# Patient Record
Sex: FEMALE | Race: OTHER | Employment: OTHER | URBAN - METROPOLITAN AREA
[De-identification: names, ages, dates, MRNs, and addresses within clinical notes are randomized per-mention and may not be internally consistent; named-entity substitution may affect disease eponyms.]

---

## 2019-11-11 ENCOUNTER — HOSPITAL ENCOUNTER (OUTPATIENT)
Dept: MAMMOGRAPHY | Age: 49
Discharge: HOME | End: 2019-11-16
Payer: COMMERCIAL

## 2019-11-11 DIAGNOSIS — Z12.31 VISIT FOR SCREENING MAMMOGRAM: ICD-10-CM

## 2019-11-11 PROCEDURE — 77067 SCR MAMMO BI INCL CAD: CPT

## 2020-02-05 ENCOUNTER — HOSPITAL ENCOUNTER (OUTPATIENT)
Dept: NEUROLOGY | Age: 50
Discharge: HOME | End: 2020-02-05
Payer: COMMERCIAL

## 2020-02-05 PROCEDURE — 95908 NRV CNDJ TST 3-4 STUDIES: CPT | Performed by: PHYSICAL MEDICINE & REHABILITATION

## 2020-02-05 PROCEDURE — 95885 MUSC TST DONE W/NERV TST LIM: CPT | Performed by: PHYSICAL MEDICINE & REHABILITATION

## 2020-02-05 NOTE — PROCEDURES
[address]      Electrodiagnostic Report  Test Date:  2020    Patient: Tayler Hennessy : 1969 Physician: Sirisha Rangel D.O.   Sex: Female Height:   Ref Phys: Uvaldo MarcosBARRY day     Patient Complaints:  Patient is a 52year-old female who presents with pain in the left lower extremity worse since August    Patient History / Exam:  PMH: + fatty liver disease fibromyalgia + knee surgery x4 last No back or hip surgery PE: reflexes absent, normal strength     Impression: Study is consistent with mild compromise of left peroneal (fibular) nerve at knee. No evidence of an acute radiculopathy or other entrapment neuropathy. Thank you. NCV & EMG Findings:  Evaluation of the left peroneal motor nerve showed decreased conduction velocity (Poplt-B Fib, 35 m/s). The left Sup Peroneal sensory nerve showed reduced amplitude (2.1 µV). All remaining nerves (as indicated in the following tables) were within normal limits. All examined muscles (as indicated in the following table) showed no evidence of electrical instability.                 Sirisha Rangel D.O.        Nerve Conduction Studies  Anti Sensory Summary Table     Stim Site NR Peak (ms) Norm Peak (ms) P-T Amp (µV) Norm P-T Amp Site1 Site2 Delta-P (ms) Dist (cm) Tommy (m/s) Norm Tommy (m/s)   Left Sup Peroneal Anti Sensory (Ant Lat Mall)   14 cm    2.6 <4.4 2.1 >5.0 14 cm Ant Lat Mall 2.6 14.0 54        1.4  8.8          Left Sural Anti Sensory (Lat Mall)   Calf    2.7 <4.2 17.2 >5.0 Calf Lat Mall 2.7 14.0 52      Motor Summary Table     Stim Site NR Onset (ms) Norm Onset (ms) O-P Amp (mV) Norm O-P Amp Site1 Site2 Delta-0 (ms) Dist (cm) Tommy (m/s) Norm Tommy (m/s)   Left Peroneal Motor (Ext Dig Brev)   Ankle    4.7 <5.5 3.7 >2.5 B Fib Ankle 4.6 32.0 70 >40   B Fib    9.3  2.7  Poplt B Fib 1.7 6.0 35 >40   Poplt    11.0  1.0          Left Tibial Motor (Abd Dillard Brev)   Ankle    4.1 <6.2 3.4 >3.0           EMG     Side Muscle

## 2020-07-08 NOTE — PROGRESS NOTES
[facility] / [facility] ([facility]) Imelda Dimas, [address] [facility]    Acknowledgment of Informed Consent for Surgical or Medical Procedure and Sedation  I agree to allow doctor(s) Lydia Ordonez and his/her associates or assistants, including residents and/or other qualified medical practitioner to perform the following medical treatment or procedure and to administer or direct the administration of sedation as necessary:  Procedure(s): LEFT KNEE ARTHROSCOPIC MEDIAL MENISECTOMY CHONDROPLASTY AND SYNOVECTOMY  My doctor has explained the following regarding the proposed procedure:   the explanation of the procedure   the benefits of the procedure   the potential problems that might occur during recuperation   the risks and side effects of the procedure which could include but are not limited to severe blood loss, infection, stroke or death   the benefits, risks and side effect of alternative procedures including the consequences of declining this procedure or any alternative procedures   the likelihood of achieving satisfactory results. I acknowledge no guarantee or assurance has been made to me regarding the results. I understand that during the course of this treatment/procedure, unforeseen conditions can occur which require an additional or different procedure. I agree to allow my physician or assistants to perform such extension of the original procedure as they may find necessary. I understand that sedation will often result in temporary impairment of memory and fine motor skills and that sedation can occasionally progress to a state of deep sedation or general anesthesia. I understand the risks of anesthesia for surgery include, but are not limited to, sore throat, hoarseness, injury to face, mouth, or teeth; nausea; headache; injury to blood vessels or nerves; death, brain damage, or paralysis.     I understand that if I have a Limitation of Treatment order in effect during my hospitalization, the order may or may not be in effect during this procedure. I give my doctor permission to give me blood or blood products. I understand that there are risks with receiving blood such as hepatitis, AIDS, fever, or allergic reaction. I acknowledge that the risks, benefits, and alternatives of this treatment have been explained to me and that no express or implied warranty has been given by the hospital, any blood bank, or any person or entity as to the blood or blood components transfused. At the discretion of my doctor, I agree to allow observers, equipment/product representatives and allow photographing, and/or televising of the procedure, provided my name or identity is maintained confidentially. I agree the hospital may dispose of or use for scientific or educational purposes any tissue, fluid, or body parts which may be removed.     ________________________________Date________Time______ am/pm  ([place])  Patient or Signature of Closest Relative or Legal Guardian    ________________________________Date________Time______am/pm      Page 1 of  1  Witness

## 2020-07-08 NOTE — PROGRESS NOTES
[facility] [employer] / [facility] ([facility]) [address] [facility], [address]    Acknowledgment of Informed Consent for Surgical or Medical Procedure and Sedation  I agree to allow doctor(s) Jayden Wong and his/her associates or assistants, including residents and/or other qualified medical practitioner to perform the following medical treatment or procedure and to administer or direct the administration of sedation as necessary:  Procedure(s): ON THE LEFT NEUROPLASTY OF COMMON PERONEAL NERVE, APPLICATION  [address]  My doctor has explained the following regarding the proposed procedure:   the explanation of the procedure   the benefits of the procedure   the potential problems that might occur during recuperation   the risks and side effects of the procedure which could include but are not limited to severe blood loss, infection, stroke or death   the benefits, risks and side effect of alternative procedures including the consequences of declining this procedure or any alternative procedures   the likelihood of achieving satisfactory results. I acknowledge no guarantee or assurance has been made to me regarding the results. I understand that during the course of this treatment/procedure, unforeseen conditions can occur which require an additional or different procedure. I agree to allow my physician or assistants to perform such extension of the original procedure as they may find necessary. I understand that sedation will often result in temporary impairment of memory and fine motor skills and that sedation can occasionally progress to a state of deep sedation or general anesthesia. I understand the risks of anesthesia for surgery include, but are not limited to, sore throat, hoarseness, injury to face, mouth, or teeth; nausea; headache; injury to blood vessels or nerves; death, brain damage, or paralysis.     I understand that if I have a Limitation of Treatment order in effect

## 2020-07-10 ENCOUNTER — OFFICE VISIT (OUTPATIENT)
Dept: PRIMARY CARE CLINIC | Age: 50
End: 2020-07-10
Payer: COMMERCIAL

## 2020-07-10 PROCEDURE — 99211 OFF/OP EST MAY X REQ PHY/QHP: CPT | Performed by: NURSE PRACTITIONER

## 2020-07-15 ENCOUNTER — ANESTHESIA EVENT (OUTPATIENT)
Dept: OPERATING ROOM | Age: 50
End: 2020-07-15
Payer: COMMERCIAL

## 2020-07-15 LAB
SARS-COV-2: NOT DETECTED
SOURCE: NORMAL

## 2020-07-15 RX ORDER — CHLORAL HYDRATE 500 MG
1 CAPSULE ORAL DAILY
COMMUNITY

## 2020-07-15 RX ORDER — CYANOCOBALAMIN (VITAMIN B-12) 500 MCG
400 LOZENGE ORAL DAILY
COMMUNITY

## 2020-07-15 RX ORDER — CIPROFLOXACIN 500 MG/1
500 TABLET, FILM COATED ORAL 2 TIMES DAILY
Status: ON HOLD | COMMUNITY
Start: 2020-07-07 | End: 2020-07-16 | Stop reason: ALTCHOICE

## 2020-07-15 RX ORDER — IBUPROFEN 200 MG
200 TABLET ORAL EVERY 6 HOURS PRN
COMMUNITY

## 2020-07-15 NOTE — PROGRESS NOTES

## 2020-07-16 ENCOUNTER — HOSPITAL ENCOUNTER (OUTPATIENT)
Age: 50
Setting detail: OUTPATIENT SURGERY
Discharge: HOME | End: 2020-07-16
Attending: PODIATRIST | Admitting: PODIATRIST
Payer: COMMERCIAL

## 2020-07-16 ENCOUNTER — ANESTHESIA (OUTPATIENT)
Dept: OPERATING ROOM | Age: 50
End: 2020-07-16
Payer: COMMERCIAL

## 2020-07-16 VITALS
HEIGHT: 59 IN | BODY MASS INDEX: 36.89 KG/M2 | WEIGHT: 183 LBS | TEMPERATURE: 97.8 F | DIASTOLIC BLOOD PRESSURE: 66 MMHG | SYSTOLIC BLOOD PRESSURE: 111 MMHG | HEART RATE: 95 BPM | RESPIRATION RATE: 16 BRPM | OXYGEN SATURATION: 97 %

## 2020-07-16 VITALS — SYSTOLIC BLOOD PRESSURE: 141 MMHG | DIASTOLIC BLOOD PRESSURE: 82 MMHG

## 2020-07-16 LAB — PREGNANCY, URINE: NEGATIVE

## 2020-07-16 PROCEDURE — 6360000002 HC RX W HCPCS: Performed by: ANESTHESIOLOGY

## 2020-07-16 PROCEDURE — 7100000000 HC PACU RECOVERY - FIRST 15 MIN: Performed by: PODIATRIST

## 2020-07-16 PROCEDURE — 7100000010 HC PHASE II RECOVERY - FIRST 15 MIN: Performed by: PODIATRIST

## 2020-07-16 PROCEDURE — 2580000003 HC RX 258: Performed by: PODIATRIST

## 2020-07-16 PROCEDURE — 3600000014 HC SURGERY LEVEL 4 ADDTL 15MIN: Performed by: PODIATRIST

## 2020-07-16 PROCEDURE — 84703 CHORIONIC GONADOTROPIN ASSAY: CPT

## 2020-07-16 PROCEDURE — 3700000001 HC ADD 15 MINUTES (ANESTHESIA): Performed by: PODIATRIST

## 2020-07-16 PROCEDURE — 3600000004 HC SURGERY LEVEL 4 BASE: Performed by: PODIATRIST

## 2020-07-16 PROCEDURE — 7100000011 HC PHASE II RECOVERY - ADDTL 15 MIN: Performed by: PODIATRIST

## 2020-07-16 PROCEDURE — 3700000000 HC ANESTHESIA ATTENDED CARE: Performed by: PODIATRIST

## 2020-07-16 PROCEDURE — 64445 NJX AA&/STRD SCIATIC NRV IMG: CPT | Performed by: ANESTHESIOLOGY

## 2020-07-16 PROCEDURE — 2500000003 HC RX 250 WO HCPCS: Performed by: PODIATRIST

## 2020-07-16 PROCEDURE — 7100000001 HC PACU RECOVERY - ADDTL 15 MIN: Performed by: PODIATRIST

## 2020-07-16 PROCEDURE — 76942 ECHO GUIDE FOR BIOPSY: CPT | Performed by: ANESTHESIOLOGY

## 2020-07-16 PROCEDURE — 2709999900 HC NON-CHARGEABLE SUPPLY: Performed by: PODIATRIST

## 2020-07-16 PROCEDURE — 2580000003 HC RX 258: Performed by: ANESTHESIOLOGY

## 2020-07-16 RX ORDER — SODIUM CHLORIDE 9 MG/ML
INJECTION, SOLUTION INTRAVENOUS CONTINUOUS
Status: DISCONTINUED | OUTPATIENT
Start: 2020-07-16 | End: 2020-07-16 | Stop reason: HOSPADM

## 2020-07-16 RX ORDER — DEXAMETHASONE SODIUM PHOSPHATE 4 MG/ML
INJECTION, SOLUTION INTRA-ARTICULAR; INTRALESIONAL; INTRAMUSCULAR; INTRAVENOUS; SOFT TISSUE
Status: DISCONTINUED
Start: 2020-07-16 | End: 2020-07-16 | Stop reason: HOSPADM

## 2020-07-16 RX ORDER — SODIUM CHLORIDE 0.9 % (FLUSH) 0.9 %
10 SYRINGE (ML) INJECTION EVERY 12 HOURS SCHEDULED
Status: DISCONTINUED | OUTPATIENT
Start: 2020-07-16 | End: 2020-07-16 | Stop reason: HOSPADM

## 2020-07-16 RX ORDER — SODIUM CHLORIDE, SODIUM LACTATE, POTASSIUM CHLORIDE, CALCIUM CHLORIDE 600; 310; 30; 20 MG/100ML; MG/100ML; MG/100ML; MG/100ML
INJECTION, SOLUTION INTRAVENOUS CONTINUOUS
Status: DISCONTINUED | OUTPATIENT
Start: 2020-07-16 | End: 2020-07-16 | Stop reason: HOSPADM

## 2020-07-16 RX ORDER — MIDAZOLAM HYDROCHLORIDE 1 MG/ML
INJECTION INTRAMUSCULAR; INTRAVENOUS
Status: DISCONTINUED
Start: 2020-07-16 | End: 2020-07-16 | Stop reason: HOSPADM

## 2020-07-16 RX ORDER — HYDROCODONE BITARTRATE AND ACETAMINOPHEN 5; 325 MG/1; MG/1
1 TABLET ORAL
Status: DISCONTINUED | OUTPATIENT
Start: 2020-07-16 | End: 2020-07-16 | Stop reason: HOSPADM

## 2020-07-16 RX ORDER — ROPIVACAINE HYDROCHLORIDE 5 MG/ML
INJECTION, SOLUTION EPIDURAL; INFILTRATION; PERINEURAL
Status: DISCONTINUED | OUTPATIENT
Start: 2020-07-16 | End: 2020-07-16 | Stop reason: SDUPTHER

## 2020-07-16 RX ORDER — BUPIVACAINE HYDROCHLORIDE AND EPINEPHRINE 2.5; 5 MG/ML; UG/ML
INJECTION, SOLUTION EPIDURAL; INFILTRATION; INTRACAUDAL; PERINEURAL PRN
Status: DISCONTINUED | OUTPATIENT
Start: 2020-07-16 | End: 2020-07-16 | Stop reason: ALTCHOICE

## 2020-07-16 RX ORDER — SODIUM CHLORIDE 0.9 % (FLUSH) 0.9 %
10 SYRINGE (ML) INJECTION PRN
Status: DISCONTINUED | OUTPATIENT
Start: 2020-07-16 | End: 2020-07-16 | Stop reason: HOSPADM

## 2020-07-16 RX ORDER — 0.9 % SODIUM CHLORIDE 0.9 %
500 INTRAVENOUS SOLUTION INTRAVENOUS
Status: DISCONTINUED | OUTPATIENT
Start: 2020-07-16 | End: 2020-07-16 | Stop reason: HOSPADM

## 2020-07-16 RX ORDER — DIPHENHYDRAMINE HYDROCHLORIDE 50 MG/ML
12.5 INJECTION INTRAMUSCULAR; INTRAVENOUS
Status: DISCONTINUED | OUTPATIENT
Start: 2020-07-16 | End: 2020-07-16 | Stop reason: HOSPADM

## 2020-07-16 RX ORDER — MAGNESIUM HYDROXIDE 1200 MG/15ML
LIQUID ORAL CONTINUOUS PRN
Status: COMPLETED | OUTPATIENT
Start: 2020-07-16 | End: 2020-07-16

## 2020-07-16 RX ORDER — LIDOCAINE HYDROCHLORIDE 20 MG/ML
JELLY TOPICAL
Status: DISCONTINUED
Start: 2020-07-16 | End: 2020-07-16 | Stop reason: HOSPADM

## 2020-07-16 RX ORDER — PROCHLORPERAZINE EDISYLATE 5 MG/ML
5 INJECTION INTRAMUSCULAR; INTRAVENOUS
Status: DISCONTINUED | OUTPATIENT
Start: 2020-07-16 | End: 2020-07-16 | Stop reason: HOSPADM

## 2020-07-16 RX ORDER — ROPIVACAINE HYDROCHLORIDE 5 MG/ML
INJECTION, SOLUTION EPIDURAL; INFILTRATION; PERINEURAL
Status: COMPLETED
Start: 2020-07-16 | End: 2020-07-16

## 2020-07-16 RX ORDER — CEFAZOLIN SODIUM 2 G/50ML
2 SOLUTION INTRAVENOUS ONCE
Status: DISCONTINUED | OUTPATIENT
Start: 2020-07-16 | End: 2020-07-16 | Stop reason: HOSPADM

## 2020-07-16 RX ORDER — BUPIVACAINE HYDROCHLORIDE 5 MG/ML
INJECTION, SOLUTION EPIDURAL; INTRACAUDAL
Status: DISCONTINUED | OUTPATIENT
Start: 2020-07-16 | End: 2020-07-16 | Stop reason: SDUPTHER

## 2020-07-16 RX ORDER — ONDANSETRON 2 MG/ML
4 INJECTION INTRAMUSCULAR; INTRAVENOUS
Status: DISCONTINUED | OUTPATIENT
Start: 2020-07-16 | End: 2020-07-16 | Stop reason: HOSPADM

## 2020-07-16 RX ORDER — HYDRALAZINE HYDROCHLORIDE 20 MG/ML
5 INJECTION INTRAMUSCULAR; INTRAVENOUS EVERY 10 MIN PRN
Status: DISCONTINUED | OUTPATIENT
Start: 2020-07-16 | End: 2020-07-16 | Stop reason: HOSPADM

## 2020-07-16 RX ORDER — MEPERIDINE HYDROCHLORIDE 25 MG/ML
12.5 INJECTION INTRAMUSCULAR; INTRAVENOUS; SUBCUTANEOUS EVERY 5 MIN PRN
Status: DISCONTINUED | OUTPATIENT
Start: 2020-07-16 | End: 2020-07-16 | Stop reason: HOSPADM

## 2020-07-16 RX ADMIN — ROPIVACAINE HYDROCHLORIDE 10 ML: 5 INJECTION, SOLUTION EPIDURAL; INFILTRATION; PERINEURAL at 09:05

## 2020-07-16 RX ADMIN — SODIUM CHLORIDE, POTASSIUM CHLORIDE, SODIUM LACTATE AND CALCIUM CHLORIDE: 600; 310; 30; 20 INJECTION, SOLUTION INTRAVENOUS at 06:38

## 2020-07-16 RX ADMIN — ROPIVACAINE HYDROCHLORIDE 20 ML: 5 INJECTION, SOLUTION EPIDURAL; INFILTRATION; PERINEURAL at 09:03

## 2020-07-16 ASSESSMENT — PAIN SCALES - GENERAL
PAINLEVEL_OUTOF10: 0
PAINLEVEL_OUTOF10: 0

## 2020-07-16 ASSESSMENT — PAIN - FUNCTIONAL ASSESSMENT: PAIN_FUNCTIONAL_ASSESSMENT: 0-10

## 2020-07-16 ASSESSMENT — LIFESTYLE VARIABLES: SMOKING_STATUS: 0

## 2020-07-16 ASSESSMENT — PAIN DESCRIPTION - DESCRIPTORS: DESCRIPTORS: ACHING

## 2020-07-16 NOTE — ANESTHESIA PROCEDURE NOTES
spread noted around the femoral artery. 20 cc total volume injected. Negative aspiration for heme prior to injection and at several points during injection. Procedure tolerated well. No apparent complications.   Medications Administered  Ropivacaine (NAROPIN) injection 0.5%, 10 mL  Reason for block: post-op pain management and at surgeon's request

## 2020-07-16 NOTE — PROGRESS NOTES
Anesthesia here to do block. left Popliteal and Saphenous blocks  O2 Mony@[employer]  Start time:0650  Stop time:0700  Tolerated well    See flow sheet for vital signs.

## 2020-07-16 NOTE — OP NOTE
Operative Note      Patient: Tayler Hennessy  YOB: 1970  MRN: [id number]    Date of Procedure: 7/16/2020    Pre-Op Diagnosis: NEURITIS M79.2, INJURY OF MAJOR PERIPHERAL NERVE S84.802D    Post-Op Diagnosis: Same       Procedure(s):    1. ON THE LEFT NEUROPLASTY OF COMMON PERONEAL NERVE    Surgeon(s):  Laurence Canas DPM    Assistant:   Resident: Alicja Gallardo DPM; Corinne Renteria DPM    Anesthesia: General with peripheral nerve block via anesthesia     Hemostasis: Anatomic dissection and marcaine with epinephrine     Estimated Blood Loss (mL): less than 50      Materials: 3-0 Vicryl, 4-0 Vicryl, Staples     Injectables: Pre: 3 cc 0.5% marcaine with epinephrine; Post: None     Complications: None    Specimens:   * No specimens in log *    Implants:  * No implants in log *      Drains: * No LDAs found *    Findings: Intra-operatively entrapment of common peroneal nerve at the posterior crural intermuscular septum    Indications for the procedure: The patient presents to the operating room today due to recalcitrant left lower extremity pain. Due to exhausting all conservative treatment options including but not limited to decrease in activity, NSAIDs, and physical therapy it was determined at this time that the patient would benefit from surgical intervention. All potential risks, benefits, and complications were discussed with the patient prior to the scheduling of the procedure. All patient's questions were answered and no guarantees were given. The patient wished to proceed with surgery and written informed consent was obtained. Detailed Description of Procedure: The patient was brought from the preoperative area into the operating room and placed on the operating room table in the supine position with care to pad all bony prominences. Following a period of sedation, a well-padded pneumatic thigh tourniquet was applied to the left lower extremity.   However, it should be noted that the pneumatic thigh tourniquet was never inflated during the procedure. Next, a preoperative anesthetic block consisting of 3 cc of 0.5% Marcaine with epinephrine was injected along the planned out incision site for hemostasis. Next, the left lower extremity was then scrubbed, prepped, and draped in the usual sterile fashion. A timeout was then performed. The patient, procedure, and operative site were identified and confirmed and the following procedure was performed. Procedure #1: Neuroplasty of common peroneal nerve, Left lower extremity: At this time, attention was directed to the left lower extremity where an approximately 8 cm curvilinear incision was centered over the fibular head. Next, utilizing a #15 blade the skin was incised down to the subcutaneous layer. All bleeders were identified electrocauterized as they were encountered. All vital neurovascular structures were carefully identified and retracted on the surgical field. Next, utilizing combination of sharp and blunt dissection the incision was deepened down to the deep fascial layer. At this time, the fibular head and posterior crural intermuscular septum overlying the Proteus longus tendon was identified, as well as, the fascial layer over the common peroneal nerve. Intraoperatively entrapment of the common peroneal nerve was noted at the posterior crural intermuscular septum. Next, utilizing a Metzenbaum scissors the fascial layer coursing along the common peroneal nerve was released. Next, utilizing a #15 blade the posterior crural intermuscular septum coursing along the common peroneal nerve was released. Next, utilizing blunt dissection the proximal tunnel of the common peroneal nerve and the distal tunnel were freed up of any other underlying fascial bands. Following release of the fascial layer overlying the common peroneal nerve and the posterior crural intermuscular septum no further underlying nerve entrapment was identified.   Next, the wound was then irrigated with copious amounts of normal sterile saline. Next, utilizing 3-0 Vicryl the subcutaneous layer was re-approximated in an over and over suture fashion. Next, utilizing 4-0 Vicryl the skin edges were reapproximated in a running subcuticular suture fashion. Next, the skin edges were then reapproximated and reinforced with staples. Next, the wound was then dressed with antibiotic ointment, Adaptic, sterile 4 x 8's, sterile cast padding, and Coban. Tape was then utilized to reinforce the surgical dressing proximal and distal to the left knee. End of procedure: The patient tolerated the procedure and anesthesia well. The patient was transferred from the operating room to PACU with vital signs stable and vascular status intact to the left lower extremity. Following a period of postoperative monitoring, the patient will be discharged to home with written and oral wound care instructions per Dr. Derrell Toney. The patient is to follow-up with Dr. Derrell Toney within the next 5 to 7 days for her first postoperative visit. The patient is to keep the dressing dry, clean, and intact. The patient is to call if any complications occur. The patient is strict nonweightbearing to the left lower extremity, but is able to stand for transfer purposes only postoperatively. Dictated on behalf of Dr. Derrell Toney, D.P.M.     Electronically signed by Angle Vallecillo DPM on 7/16/2020 at 3:12 PM

## 2020-07-16 NOTE — PROGRESS NOTES
Pt arrived calm denies surgical site pain CO of throat pain report from crna of uneventful course of treatment in OR surgical resident reported pt will be NWB LLE may toe touch for transfers only

## 2020-07-16 NOTE — PROGRESS NOTES
Ambulatory Surgery/Procedure Discharge Note    Vitals:    07/16/20 0943   BP: 111/66   Pulse: 95   Resp: 16   Temp: 97.8 °F (36.6 °C)   SpO2: 97%       In: 360 [P.O.:240; I.V.:120]  Out: 600 [Urine:600]    Restroom use offered before discharge. Yes, pt void per bedpan, pt void per bathroom assist x1. Pain assessment:  level of pain (1-10, 10 severe)  Pain Level: 0  Pt to SDS post left neuroplasty of common peroneal nerve, application of below knee splint. Ace bandage and splint clean, dry and intact. Capillary refill brisk in operative extremity, toes warm. Pt voided 600 ml per bedpan. Pt denies pain. Pt denies nausea. Light snack and beverage refused per pt request. Surgical shoe applied to left foot. Discharge instructions given to pt's  and he states understanding of these instructions. BP meets arelis discharge standards. Patient discharged to home/self care.  Patient discharged via wheel chair by transporter to waiting family/S.O.       7/16/2020 10:41 AM

## 2020-07-16 NOTE — BRIEF OP NOTE
Brief Postoperative Note      Patient: Tayler Hennessy  YOB: 1970  MRN: [id number]    Date of Procedure: 7/16/2020    Pre-Op Diagnosis: NEURITIS M79.2, INJURY OF MAJOR PERIPHERAL NERVE S84.802D    Post-Op Diagnosis: Same       Procedure(s):    1.  ON THE LEFT NEUROPLASTY OF COMMON PERONEAL NERVE    Surgeon(s):  Christofer Abrams DPM    Assistant:  Resident: Xavier Nguyễn DPM; Ginger Pickett DPM    Anesthesia: General with peripheral nerve block via anesthesia    Hemostasis: Anatomic dissection and marcaine with epinephrine    Estimated Blood Loss (mL): less than 50     Materials: 3-0 Vicryl, 4-0 Vicryl, Staples    Injectables: Pre: 3 cc 0.5% marcaine with epinephrine; Post: None    Complications: None    Specimens:   * No specimens in log *    Implants:  * No implants in log *      Drains: * No LDAs found *    Findings: See operative report    Electronically signed by Ginger Pickett DPM on 7/16/2020 at 8:16 AM

## 2020-07-16 NOTE — ANESTHESIA PRE PROCEDURE
Min PRN Rushe Harada, DO        HYDROcodone-acetaminophen [facility]) 5-325 MG per tablet 1 tablet  1 tablet Oral Once PRN Rushe Harada, DO        diphenhydrAMINE (BENADRYL) injection 12.5 mg  12.5 mg Intravenous Once PRN Rushe Harada, DO        0.9 % sodium chloride bolus  500 mL Intravenous Once PRN Rushe Harada, DO        ondansetron [facility]) injection 4 mg  4 mg Intravenous Once PRN Waynette Harada, DO        prochlorperazine (COMPAZINE) injection 5 mg  5 mg Intravenous Once PRN Anandnette Harada, DO        hydrALAZINE (APRESOLINE) injection 5 mg  5 mg Intravenous Q10 Min PRN Rushe Harada, DO        meperidine (DEMEROL) injection 12.5 mg  12.5 mg Intravenous Q5 Min PRN Rushe Harada, DO        midazolam (VERSED) 2 MG/2ML injection             lidocaine (XYLOCAINE) 2 % jelly             dexamethasone (DECADRON) 4 MG/ML injection             ropivacaine (NAROPIN) 0.5% injection                Allergies: Allergies   Allergen Reactions    Iodine Shortness Of Breath    Other Rash     Neoprene, Cats, Dogs, Dust, Weeds, Grass, Trees, Horses       Problem List:  There is no problem list on file for this patient. Past Medical History:        Diagnosis Date    Allergy-induced asthma     Fibromyalgia        Past Surgical History:        Procedure Laterality Date    APPENDECTOMY  1985 approx    COLONOSCOPY      ENDOSCOPY, COLON, DIAGNOSTIC      KNEE ARTHROSCOPY Left     x4    KNEE SURGERY      L knee arthroscopic x 3 times    WRIST ARTHROSCOPY  2005 approx     L wrist  surgery  rt wrist cyst removal 1987    WRIST SURGERY      no metal implant       Social History:    Social History     Tobacco Use    Smoking status: Never Smoker    Smokeless tobacco: Never Used   Substance Use Topics    Alcohol use:  No                                Counseling given: Not Answered      Vital Signs (Current):   Vitals:    07/15/20 0834 07/16/20 0617   BP:  119/86   Pulse:  90   Resp:  16 Temp:  98.8 °F (37.1 °C)   TempSrc:  Oral   Weight: 183 lb (83 kg) 183 lb (83 kg)   Height: 4' 11\" (1.499 m) 4' 11\" (1.499 m)                                              BP Readings from Last 3 Encounters:   07/16/20 119/86   05/13/11 102/60       NPO Status: Time of last liquid consumption: 0100                        Time of last solid consumption: 2030                        Date of last liquid consumption: 07/16/20                        Date of last solid food consumption: 07/15/20    BMI:   Wt Readings from Last 3 Encounters:   07/16/20 183 lb (83 kg)   05/13/11 182 lb 6.4 oz (82.7 kg)     Body mass index is 36.96 kg/m². CBC: No results found for: WBC, RBC, HGB, HCT, MCV, RDW, PLT    CMP: No results found for: NA, K, CL, CO2, BUN, CREATININE, GFRAA, AGRATIO, LABGLOM, GLUCOSE, PROT, CALCIUM, BILITOT, ALKPHOS, AST, ALT    POC Tests: No results for input(s): POCGLU, POCNA, POCK, POCCL, POCBUN, POCHEMO, POCHCT in the last 72 hours.     Coags: No results found for: PROTIME, INR, APTT    HCG (If Applicable):   Lab Results   Component Value Date    PREGTESTUR Negative 07/16/2020        ABGs: No results found for: PHART, PO2ART, WHA0DHP, XMV8OYD, BEART, T9LSIJPO     Type & Screen (If Applicable):  No results found for: LABABO, LABRH    Drug/Infectious Status (If Applicable):  No results found for: HIV, HEPCAB    COVID-19 Screening (If Applicable):   Lab Results   Component Value Date    COVID19 Not Detected 07/10/2020         Anesthesia Evaluation  Patient summary reviewed and Nursing notes reviewed no history of anesthetic complications:   Airway: Mallampati: I  TM distance: >3 FB   Neck ROM: full  Mouth opening: > = 3 FB Dental: normal exam         Pulmonary: breath sounds clear to auscultation  (+) asthma: seasonal asthma,     (-) not a current smoker (never)                           Cardiovascular:  Exercise tolerance: good (>4 METS),         NYHA Classification: I    Rhythm: regular  Rate: normal Beta Blocker:  Not on Beta Blocker         Neuro/Psych:   Negative Neuro/Psych ROS              GI/Hepatic/Renal:        (-) GERD       Endo/Other: Negative Endo/Other ROS                    Abdominal:           Vascular:                                        Anesthesia Plan      general and regional     ASA 2       Induction: intravenous. MIPS: Prophylactic antiemetics administered. Anesthetic plan and risks discussed with patient. Plan discussed with CRNA.     Attending anesthesiologist reviewed and agrees with Pre Eval content              Barbi Pbalo DO   7/16/2020

## 2020-07-16 NOTE — H&P
Tayler Hennessy    [id number]    [facility] Same Day Surgery Update H & P  Department of General Surgery   Surgical Service   Pre-operative History and Physical  Last H & P within the last 30 days. DIAGNOSIS:   Neuritis [M79.2]  Injury of other peripheral nerve of left lower extremity, unspecified injury location, subsequent encounter [K92.179Z]    PROCEDURE:  IA NEUROPLASTY OTHER ARM/LEG NERVE,OPEN [41408] (ON THE LEFT NEUROPLASTY OF COMMON PERONEAL NERVE, APPLICATION OF BELOW KNEE SPLINT)     HISTORY OF PRESENT ILLNESS:   Patient with left lower extremity pain, numbness and tingling in the setting of left peroneal neuropathy. The symptoms have been recalcitrant to conservative treatment and the patient presents today for the above procedure. Covid 19:  Patient denies fever, chills, cough or known exposure to Covid-19.   Patient reports they have been quarantined at home since Covid-19 test.      Past Medical History:        Diagnosis Date    Allergy-induced asthma     Fibromyalgia      Past Surgical History:        Procedure Laterality Date    APPENDECTOMY  1985 approx    COLONOSCOPY      ENDOSCOPY, COLON, DIAGNOSTIC      KNEE ARTHROSCOPY Left     x4    KNEE SURGERY      L knee arthroscopic x 3 times    WRIST ARTHROSCOPY  2005 approx     L wrist  surgery  rt wrist cyst removal 1987    WRIST SURGERY      no metal implant     Past Social History:  Social History     Socioeconomic History    Marital status:      Spouse name: None    Number of children: None    Years of education: None    Highest education level: None   Occupational History    None   Social Needs    Financial resource strain: None    Food insecurity     Worry: None     Inability: None    Transportation needs     Medical: None     Non-medical: None   Tobacco Use    Smoking status: Never Smoker    Smokeless tobacco: Never Used   Substance and Sexual Activity    Alcohol use: No    Drug use: None    Sexual activity: None   Lifestyle    Physical activity     Days per week: None     Minutes per session: None    Stress: None   Relationships    Social connections     Talks on phone: None     Gets together: None     Attends [employer] service: None     Active member of club or organization: None     Attends meetings of clubs or organizations: None     Relationship status: None    Intimate partner violence     Fear of current or ex partner: None     Emotionally abused: None     Physically abused: None     Forced sexual activity: None   Other Topics Concern    None   Social History Narrative    None         Medications Prior to Admission:      Prior to Admission medications    Medication Sig Start Date End Date Taking? Authorizing Provider   Pseudoephedrine-DM (DECONGESTANT D PO) Take 1 tablet by mouth as needed    Historical Provider, MD   Milk Thistle 175 MG CAPS Take 175 mg by mouth daily    Historical Provider, MD   Vitamin E 400 units TABS Take 400 Units by mouth daily    Historical Provider, MD   Omega-3 Fatty Acids (FISH OIL) 1000 MG CAPS Take 1 capsule by mouth daily    Historical Provider, MD   ibuprofen (ADVIL;MOTRIN) 200 MG tablet Take 200 mg by mouth every 6 hours as needed    Historical Provider, MD   Multiple Vitamin (MULTIVITAMIN PO) Take  by mouth. Historical Provider, MD   vitamin D (CHOLECALCIFEROL) 400 UNIT TABS tablet Take 400 Units by mouth daily.       Historical Provider, MD         Allergies:  Iodine and Other    PHYSICAL EXAM:      /86   Pulse 90   Temp 98.8 °F (37.1 °C) (Oral)   Resp 16   Ht 4' 11\" (1.499 m)   Wt 183 lb (83 kg)   LMP  (LMP Unknown)   BMI 36.96 kg/m²      Airway:  Airway patent with no audible stridor    Heart:  Regular rate and rhythm, No murmur noted    Lungs:  No increased work of breathing, good air exchange, clear to auscultation bilaterally, no crackles or wheezing    Abdomen:  Soft, non-distended, non-tender, normal active bowel sounds, no masses

## 2020-07-16 NOTE — ANESTHESIA PROCEDURE NOTES
Peripheral Block    Patient location during procedure: procedure area  Start time: 7/16/2020 6:48 AM  End time: 7/16/2020 6:52 AM  Staffing  Anesthesiologist: Flako Vital DO  Performed: anesthesiologist   Preanesthetic Checklist  Completed: patient identified, site marked, surgical consent, pre-op evaluation, timeout performed, IV checked, risks and benefits discussed, monitors and equipment checked, anesthesia consent given, oxygen available and patient being monitored  Peripheral Block  Patient position: supine  Prep: ChloraPrep  Patient monitoring: cardiac monitor, continuous pulse ox, frequent blood pressure checks and IV access  Block type: Sciatic  Laterality: left  Injection technique: single-shot  Procedures: ultrasound guided  Popliteal  Provider prep: mask and sterile gloves  Needle  Needle type: combined needle/nerve stimulator   Needle gauge: 20 G  Needle length: 8 cm  Needle localization: anatomical landmarks and ultrasound guidance  Test dose: negative  Assessment  Injection assessment: negative aspiration for heme, no paresthesia on injection and local visualized surrounding nerve on ultrasound  Paresthesia pain: none  Slow fractionated injection: yes  Hemodynamics: stable  Additional Notes  Left Popliteal Fossa Sciatic Nerve Block    Indication: Postoperative analgesia upon surgeon request.    Procedure: Patient supine . Landmarks identified. Sterile prep and drape. A 22G 80-mm insulated regional block needle was inserted 6cm cephalad of the left popliteal crease 1cm lateral to the midline; the hub of the needle was raised 30 degrees from horizontal and the needle was directed cephalad. Ropivacaine 0.5%  20 cc with 5 cc of normal saline and 4 mg decadron was injected in 5cc increments to a total volume of 25cc with negative aspiration for heme prior to each injection. The procedure was tolerated well and there were no apparent complications.   Medications Administered  Ropivacaine (NAROPIN) injection 0.5%, 20 mL  Reason for block: post-op pain management and at surgeon's request

## (undated) DEVICE — 3M™ STERI-STRIP™ REINFORCED ADHESIVE SKIN CLOSURES, R1541, 1/4 IN X 3 IN (6 MM X 75 MM), 3 STRIPS/ENVELOPE: Brand: 3M™ STERI-STRIP™

## (undated) DEVICE — STANDARD HYPODERMIC NEEDLE,POLYPROPYLENE HUB: Brand: MONOJECT

## (undated) DEVICE — BANDAGE COBAN 4 IN COMPR W4INXL5YD FOAM COHESIVE QUIK STK SELF ADH SFT

## (undated) DEVICE — SUTURE VCRL SZ 4-0 L27IN ABSRB UD L26MM SH 1/2 CIR J415H

## (undated) DEVICE — MASTISOL ADHESIVE LIQ 2/3ML

## (undated) DEVICE — E-Z CLEAN, NON-STICK, PTFE COATED, ELECTROSURGICAL BLADE ELECTRODE, 2.5 INCH (6.35 CM): Brand: EZ CLEAN

## (undated) DEVICE — STERILE PVP: Brand: MEDLINE INDUSTRIES, INC.

## (undated) DEVICE — BANDAGE COMPR M W4INXL10YD WHT BGE VELC E MTRX HK AND LOOP

## (undated) DEVICE — TRAY PREP DRY W/ PREM GLV 2 APPL 6 SPNG 2 UNDPD 1 OVERWRAP

## (undated) DEVICE — COVER LT HNDL BLU PLAS

## (undated) DEVICE — STAPLER SKIN H3.9MM WIRE DIA0.58MM CRWN 6.9MM 35 STPL ROT

## (undated) DEVICE — INTENDED FOR TISSUE SEPARATION, AND OTHER PROCEDURES THAT REQUIRE A SHARP SURGICAL BLADE TO PUNCTURE OR CUT.: Brand: BARD-PARKER ® CARBON RIB-BACK BLADES

## (undated) DEVICE — JEWISH HOSPITAL TURNOVER KIT: Brand: MEDLINE INDUSTRIES, INC.

## (undated) DEVICE — Device

## (undated) DEVICE — PLATE ES AD W 9FT CRD 2

## (undated) DEVICE — SOLUTION IV 1000ML 0.9% SOD CHL

## (undated) DEVICE — GLOVE SURG SZ 85 L12IN FNGR ORTHO 126MIL CRM LTX FREE

## (undated) DEVICE — PODIATRY PK

## (undated) DEVICE — SPLINT CAST 4INX15FT ORTHO GLS

## (undated) DEVICE — SYRINGE, LUER LOCK, 10ML: Brand: MEDLINE

## (undated) DEVICE — SUTURE VCRL SZ 3-0 L27IN ABSRB UD L26MM CT-2 1/2 CIR J232H

## (undated) DEVICE — PADDING CAST W4INXL4YD HIGHLY ABSRB THAN COT EZ APPL

## (undated) DEVICE — ROYAL SILK SURGICAL GOWN, XXXL, LONG: Brand: CONVERTORS

## (undated) DEVICE — PROTECTOR ULN NRV PUR FOAM HK LOOP STRP ANATOMICALLY